# Patient Record
Sex: MALE | Race: WHITE
[De-identification: names, ages, dates, MRNs, and addresses within clinical notes are randomized per-mention and may not be internally consistent; named-entity substitution may affect disease eponyms.]

---

## 2020-08-02 ENCOUNTER — HOSPITAL ENCOUNTER (EMERGENCY)
Dept: HOSPITAL 62 - ER | Age: 70
LOS: 1 days | Discharge: HOME | End: 2020-08-03
Payer: COMMERCIAL

## 2020-08-02 DIAGNOSIS — I10: Primary | ICD-10-CM

## 2020-08-02 DIAGNOSIS — Z87.891: ICD-10-CM

## 2020-08-02 LAB
ADD MANUAL DIFF: NO
ALBUMIN SERPL-MCNC: 4.9 G/DL (ref 3.5–5)
ALP SERPL-CCNC: 57 U/L (ref 38–126)
ANION GAP SERPL CALC-SCNC: 10 MMOL/L (ref 5–19)
APPEARANCE UR: CLEAR
APTT PPP: COLORLESS S
AST SERPL-CCNC: 30 U/L (ref 17–59)
BASOPHILS # BLD AUTO: 0 10^3/UL (ref 0–0.2)
BASOPHILS NFR BLD AUTO: 0.3 % (ref 0–2)
BILIRUB DIRECT SERPL-MCNC: 0 MG/DL (ref 0–0.4)
BILIRUB SERPL-MCNC: 0.5 MG/DL (ref 0.2–1.3)
BILIRUB UR QL STRIP: NEGATIVE
BUN SERPL-MCNC: 12 MG/DL (ref 7–20)
CALCIUM: 10.1 MG/DL (ref 8.4–10.2)
CHLORIDE SERPL-SCNC: 100 MMOL/L (ref 98–107)
CO2 SERPL-SCNC: 27 MMOL/L (ref 22–30)
EOSINOPHIL # BLD AUTO: 0.4 10^3/UL (ref 0–0.6)
EOSINOPHIL NFR BLD AUTO: 4.5 % (ref 0–6)
ERYTHROCYTE [DISTWIDTH] IN BLOOD BY AUTOMATED COUNT: 13.9 % (ref 11.5–14)
GLUCOSE SERPL-MCNC: 122 MG/DL (ref 75–110)
GLUCOSE UR STRIP-MCNC: NEGATIVE MG/DL
HCT VFR BLD CALC: 47.1 % (ref 37.9–51)
HGB BLD-MCNC: 15.9 G/DL (ref 13.5–17)
KETONES UR STRIP-MCNC: NEGATIVE MG/DL
LYMPHOCYTES # BLD AUTO: 2.7 10^3/UL (ref 0.5–4.7)
LYMPHOCYTES NFR BLD AUTO: 29.1 % (ref 13–45)
MCH RBC QN AUTO: 29.9 PG (ref 27–33.4)
MCHC RBC AUTO-ENTMCNC: 33.6 G/DL (ref 32–36)
MCV RBC AUTO: 89 FL (ref 80–97)
MONOCYTES # BLD AUTO: 0.8 10^3/UL (ref 0.1–1.4)
MONOCYTES NFR BLD AUTO: 8.1 % (ref 3–13)
NEUTROPHILS # BLD AUTO: 5.4 10^3/UL (ref 1.7–8.2)
NEUTS SEG NFR BLD AUTO: 58 % (ref 42–78)
NITRITE UR QL STRIP: NEGATIVE
PH UR STRIP: 7 [PH] (ref 5–9)
PLATELET # BLD: 298 10^3/UL (ref 150–450)
POTASSIUM SERPL-SCNC: 3.9 MMOL/L (ref 3.6–5)
PROT SERPL-MCNC: 8 G/DL (ref 6.3–8.2)
PROT UR STRIP-MCNC: NEGATIVE MG/DL
RBC # BLD AUTO: 5.3 10^6/UL (ref 4.35–5.55)
SP GR UR STRIP: 1
TOTAL CELLS COUNTED % (AUTO): 100 %
UROBILINOGEN UR-MCNC: NEGATIVE MG/DL (ref ?–2)
WBC # BLD AUTO: 9.3 10^3/UL (ref 4–10.5)

## 2020-08-02 PROCEDURE — 84484 ASSAY OF TROPONIN QUANT: CPT

## 2020-08-02 PROCEDURE — 36415 COLL VENOUS BLD VENIPUNCTURE: CPT

## 2020-08-02 PROCEDURE — 93010 ELECTROCARDIOGRAM REPORT: CPT

## 2020-08-02 PROCEDURE — 80053 COMPREHEN METABOLIC PANEL: CPT

## 2020-08-02 PROCEDURE — 85025 COMPLETE CBC W/AUTO DIFF WBC: CPT

## 2020-08-02 PROCEDURE — 81001 URINALYSIS AUTO W/SCOPE: CPT

## 2020-08-02 PROCEDURE — 96374 THER/PROPH/DIAG INJ IV PUSH: CPT

## 2020-08-02 PROCEDURE — 96361 HYDRATE IV INFUSION ADD-ON: CPT

## 2020-08-02 PROCEDURE — 93005 ELECTROCARDIOGRAM TRACING: CPT

## 2020-08-02 PROCEDURE — 99284 EMERGENCY DEPT VISIT MOD MDM: CPT

## 2020-08-02 NOTE — ER DOCUMENT REPORT
Entered by CHANDRIKA LEON SCRIBE  [id number] 





Acting as scribe for:ISH GOLDMAN IV, MD





ED Blood Pressure Problem





- General


Chief Complaint: High Blood Pressure


Stated Complaint: HYPERTENSION


Time Seen by Provider: 20 22:13


Mode of Arrival: Medic


Information source: Patient


Notes: 





This 69 year old male patient with a history of HTN brought in by EMS from home 

presents to the ED today with complaints of an elevated blood pressure reading. 

Patient states that he was outside working in the heat today with decreased 

fluid intake and felt dehydrated. He states that his blood pressure usually runs

in the 120-130s systolic, but today it was 200/112. He reports that he took at 

total of approximately 65 mg of Metoprolol today. He did receive IV fluids via 

EMS and states that he feels much better. 





Past Medical History





- General


Information source: Patient





- Social History


Smoking Status: Former Smoker


Cigarette use (# per day): No


Chew tobacco use (# tins/day): No


Smoking Education Provided: No


Lives with: Spouse/Significant other


Family History: Reviewed & Not Pertinent


Patient has suicidal ideation: No


Patient has homicidal ideation: No





- Past Medical History


Cardiac Medical History: Reports: Hx Hypertension





Review of Systems





- Review of Systems


Constitutional: See HPI, Other - Elevated blood pressure


EENT: No symptoms reported


Cardiovascular: No symptoms reported


Respiratory: No symptoms reported


Gastrointestinal: See HPI, Poor fluid intake


Genitourinary: No symptoms reported


Male Genitourinary: No symptoms reported


Musculoskeletal: No symptoms reported


Skin: No symptoms reported


Hematologic/Lymphatic: No symptoms reported


Neurological/Psychological: No symptoms reported


-: Yes All other systems reviewed and negative





Physical Exam





- Vital signs


Vitals: 


                                        











Pulse Ox


 


 99 


 


 20 21:52














- General


General appearance: Appears well, Alert


In distress: None





- HEENT


Head: Normocephalic, Atraumatic


Eyes: Normal


Pupils: PERRL





- Respiratory


Respiratory status: No respiratory distress


Chest status: Nontender


Breath sounds: Normal


Chest palpation: Normal





- Cardiovascular


Rhythm: Regular


Heart sounds: Normal auscultation


Murmur: No


Friction rub: No


Gallop: None auscultated





- Abdominal


Inspection: Normal


Distension: No distension


Bowel sounds: Normal


Tenderness: Nontender - Abdomen soft


Organomegaly: No organomegaly





- Back


Back: Normal, Nontender





- Extremities


General upper extremity: Normal inspection


General lower extremity: Normal inspection





- Neurological


Neuro grossly intact: Yes


Orientation: AAOx4


[place] Coma Scale Eye Opening: Spontaneous


[place] Coma Scale Verbal: Oriented


[place] Coma Scale Motor: Obeys Commands


[place] Coma Scale Total: 15





- Psychological


Associated symptoms: Normal affect, Normal mood





- Skin


Skin Temperature: Warm


Skin Moisture: Dry


Skin Color: Normal





Course





- Re-evaluation


Re-evalutation: 





20 00:17


Results of ED MSE discussed with patient.  Patient's blood pressure is now 

150/86.  Patient states he feels completely fine and much better than when he 

initially arrived.  All questions were answered prior to discharge.  Emergency 

signs and symptoms, reasons to return to the emergency department discussed with

patient.





- Vital Signs


Vital signs: 


                                        











Temp Pulse Resp BP Pulse Ox


 


 98.4 F      22 H  150/78 H  98 


 


 20 22:00     20 23:01  20 23:01  20 23:01














- Laboratory


Result Diagrams: 


                                 20 21:00





                                 20 21:00


Laboratory results interpreted by me: 


                                        











  20





  21:00


 


Sodium  136.5 L


 


Glucose  122 H














- EKG Interpretation by Me


Additional EKG results interpreted by me: 





20 00:18


EKG obtained on 2020 at 2142 hrs. was interpreted by this MD.  Findings 

normal sinus rhythm, rate 72, normal axis, P waves preceding QRS complexes, QRS 

complexes appear narrow, there are no obvious patterns of ST segment elevation 

or depression present to suggest acute myocardial ischemia or infarction.  

Impression: Normal sinus rhythm with nonspecific ST segments.





Discharge





- Discharge


Clinical Impression: 


 Elevated blood pressure reading





Condition: Stable


Disposition: HOME, SELF-CARE


Additional Instructions: 


Return to the Emergency Department without delay if any worse.





Follow-up with your primary care provider as needed.





HOME CARE INSTRUCTIONS & INFORMATION:  Thank you for choosing us for your 

medical needs. We hope you're satisfied with the care you received.  After you 

leave, you must properly care for your problem and, at the same time, observe 

its progress.  Any condition can change.  Some illnesses can change rapidly over

hours or days.  If your condition worsens, return to the Emergency Department or

see your physician promptly.





ABOUT YOUR X-RAYS AND EKG'S:   If you had an EKG or X-rays taken, they have been

read by the Emergency Physician. The X-rays and EKG's will also be read by a R

adiologist or Cardiologist within 24 hours.  If discrepancies are noted, you 

will be notified by telephone.  Please be certain the ED has a correct telephone

number & address where you can be reached.  Also, realize that some fractures or

abnormalities do not show up on initial X-rays.  If your symptoms continue, see 

your physician.





ABOUT YOUR LABORATORY TEST:   If you had laboratory tests, the results have been

reviewed by the Emergency Physician.  Some test results (for example cultures) 

may not be available for several days.  You will be contacted if any test result

shows you need additional treatment.  Please be certain the ED has a correct 

telephone number and address where you can be reached.





ABOUT YOUR MEDICATIONS:  You will receive instructions on how to take your 

medicine on the prescription label you receive.  Additional information may be 

provided by the Pharmacy.  If you have questions afterwards, call the ED for 

clarification or further instructions.  Some prescribed medications may cause 

drowsiness.  Do not perform tasks such as driving a car or operating machinery 

without consulting your Pharmacist.  If you feel you need a refill of pain 

medication, your condition will need re-evaluation.  Please do not call for a 

refill of any medication.





ABOUT YOUR SIGNATURE:   Signature of this document acknowledges to followin. Understanding that you received emergency treatment and that you may be 

   released before al medical problems are known or treated. Please be certain  

   the ED has a correct phone number & address where you can be reached.


   2. Acknowledgement that you will arrange for follow-up care as recommended.


   3. Authorization for the Emergency Physician to provide information to your 

follow-up Physician in order to maximize your care.





AT ANY TIME, IF YOUR SYMPTOMS CHANGE SIGNIFICANTLY OR WORSEN OR YOU DEVELOP NEW 

SYMPTOMS, RETURN TO THE EMERGENCY DEPARTMENT IMMEDIATELY FOR RE-EVALUATION.





OUR GOAL IS TO PROVIDE EXCELLENT MEDICAL CARE!





WE HOPE THAT WE HAVE MET YOUR EXPECTATIONS DURING YOUR EMERGENCY DEPARTMENT 

VISIT AND THAT YOU FEEL YOU HAVE RECEIVED EXCELLENT CARE!














I personally performed the services described in the documentation, reviewed and

edited the documentation which was dictated to the scribe in my presence, and it

accurately records my words and actions.

## 2020-08-03 VITALS — DIASTOLIC BLOOD PRESSURE: 86 MMHG | SYSTOLIC BLOOD PRESSURE: 151 MMHG

## 2020-09-11 ENCOUNTER — HOSPITAL ENCOUNTER (EMERGENCY)
Dept: HOSPITAL 62 - ER | Age: 70
LOS: 1 days | Discharge: HOME | End: 2020-09-12
Payer: MEDICARE

## 2020-09-11 VITALS — DIASTOLIC BLOOD PRESSURE: 80 MMHG | SYSTOLIC BLOOD PRESSURE: 148 MMHG

## 2020-09-11 DIAGNOSIS — Z79.899: ICD-10-CM

## 2020-09-11 DIAGNOSIS — H40.9: ICD-10-CM

## 2020-09-11 DIAGNOSIS — I10: Primary | ICD-10-CM

## 2020-09-11 DIAGNOSIS — Z88.1: ICD-10-CM

## 2020-09-11 DIAGNOSIS — R42: ICD-10-CM

## 2020-09-11 DIAGNOSIS — Z88.0: ICD-10-CM

## 2020-09-11 PROCEDURE — 99283 EMERGENCY DEPT VISIT LOW MDM: CPT

## 2020-09-12 NOTE — ER DOCUMENT REPORT
ED Blood Pressure Problem





- General


Chief Complaint: High Blood Pressure


Stated Complaint: BLOOD PRESSURE


Time Seen by Provider: 09/12/20 00:12


Notes: 





CHIEF COMPLAINT: Hypertension





HPI: 69-year-old male presenting for evaluation of hypertension today.  Patient 

is on vacation.  Patient has been eating large amounts of foods with lots of 

salt in them recently and noticed his blood pressure was elevated at 170/101 

today.  Had no chest pain shortness of breath headache or dizziness with this.  

Patient took an extra half of his metoprolol this evening and blood pressure is 

now improved.  He remains asymptomatic.  Patient also requesting an Antivert as 

he states that he has glaucoma and when he was putting his drops in earlier he 

tilted his head to the side and had a sudden onset of dizziness with head 

movement only.  It resolved when he was still.  States this has happened before 

and he was treated with Antivert.





ROS: See HPI - all other systems were reviewed and are otherwise negative


Constitutional: no fever 


Eyes: no drainage, no blurred vision


ENT: no runny nose, no sore throat


Cardiovascular:  no chest pain 


Resp: no SOB, no cough


GI: no vomiting, no diarrhea, no abdominal pain


: no dysuria


Integumentary: no rash 


Allergy: no hives 


Musculoskeletal: no extremity pain or swelling 


Neurological: no numbness/tingling, no weakness





MEDICATIONS: I agree with the patient medications as charted by the RN.





ALLERGIES: I agree with the allergies as charted by the RN.





PAST MEDICAL HISTORY/PAST SURGICAL HISTORY: Reviewed and agree as charted by RN.





SOCIAL HISTORY: Reviewed and agree as charted by RN.





FAMILY HISTORY: No significant familial comorbid conditions directly related to 

patient complaint





EXAM:


Reviewed vital signs as charted by RN.


CONSTITUTIONAL: Alert and oriented and responds appropriately to questions. 

Well-appearing; well-nourished


HEAD: Normocephalic; atraumatic


EYES: PERRL; Conjunctivae clear, sclerae non-icteric


ENT: normal nose; no rhinorrhea; moist mucous membranes; pharynx without lesions

noted, no uvula edema or deviation, no tonsillar hypertrophy, phonation normal


NECK: Supple without meningismus; non-tender; no cervical lymphadenopathy, no 

masses


CARD: RRR; no murmurs, no clicks, no rubs, no gallops; symmetric distal pulses


RESP: Normal chest excursion without splinting or tachypnea; breath sounds clear

and equal bilaterally; no wheezes, no rhonchi, no rales, pulse oximetry 99% on 

room air not hypoxic


ABD/GI: Normal bowel sounds; non-distended; soft, non-tender, no rebound, no 

guarding; no palpable organomegaly or masses.


BACK:  The back appears normal and is non-tender to palpation, there is no CVA 

tenderness


EXT: Normal ROM in all joints; non-tender to palpation; no cyanosis, no 

effusions, no edema   


SKIN: Normal color for age and race; warm; dry; good turgor; no acute lesions 

noted


NEURO: Moves all extremities equally; Motor and sensory function intact.  CN II 

through XII grossly intact.  Gait is normal.  Strength equal 5/5 bilateral upper

and lower extremities.  Sensation intact and equal bilateral upper and lower 

extremities.


PSYCH: The patient's mood and manner are appropriate. Grooming and personal 

hygiene are appropriate.





MDM: 69-year-old male with hypertension history is been on multiple prior 

medications for blood pressure on metoprolol currently has been eating lots of 

salty foods here on vacation now with elevated blood pressure today which has 

significantly improved with an extra half dose of his blood pressure medicine.  

He is returning home on Tuesday has an appointment with his PCP is otherwise 

completely asymptomatic at this time no indication for further testing.  Patient

is requesting Antivert for an episode of vertigo that he had yesterday when he 

was putting his glaucoma medicines in.  He has absolutely no symptoms of CVA at 

this time





- Related Data


Allergies/Adverse Reactions: 


                                        





erythromycin base Allergy (Verified 09/12/20 00:02)


   


Penicillins Allergy (Verified 09/12/20 00:02)


   








Home Medications: metoprolol





Past Medical History





- Social History


Smoking Status: Never Smoker


Chew tobacco use (# tins/day): No


Frequency of alcohol use: None


Drug Abuse: None


Family History: Reviewed & Not Pertinent





- Past Medical History


Cardiac Medical History: Reports: Hx Hypertension





Physical Exam





- Vital signs


Vitals: 


                                        











Temp Pulse Resp BP Pulse Ox


 


 98.6 F   55 L  20   148/80 H  98 


 


 09/11/20 23:41  09/11/20 23:41  09/11/20 23:41  09/11/20 23:41  09/11/20 23:41














Course





- Vital Signs


Vital signs: 


                                        











Temp Pulse Resp BP Pulse Ox


 


 98.6 F   55 L  20   148/80 H  98 


 


 09/11/20 23:41  09/11/20 23:41  09/11/20 23:41  09/11/20 23:41  09/11/20 23:41














Discharge





- Discharge


Clinical Impression: 


 Vertigo





Hypertension


Qualifiers:


 Hypertension type: essential hypertension Qualified Code(s): I10 - Essential 

(primary) hypertension





Condition: Stable


Disposition: HOME, SELF-CARE


Additional Instructions: 


Stop eating salty foods.  Hydrate well.  Take your normal dose of your 

metoprolol in the morning and if you need to take an additional 25 mg you may do

so in the evening.  Follow-up on Tuesday with your primary care provider or 

obtain a cardiologist for further evaluation and management of your blood 

pressure when you return home.  If you develop chest pain shortness of breath 

return for reevaluation